# Patient Record
Sex: MALE | URBAN - METROPOLITAN AREA
[De-identification: names, ages, dates, MRNs, and addresses within clinical notes are randomized per-mention and may not be internally consistent; named-entity substitution may affect disease eponyms.]

---

## 2023-04-10 ENCOUNTER — OFFICE VISIT (OUTPATIENT)
Dept: URBAN - METROPOLITAN AREA CLINIC 88 | Facility: CLINIC | Age: 48
End: 2023-04-10
Payer: MEDICAID

## 2023-04-10 DIAGNOSIS — H25.13 AGE-RELATED NUCLEAR CATARACT, BILATERAL: Primary | ICD-10-CM

## 2023-04-10 DIAGNOSIS — E11.3413 DIABETES MELLITUS TYPE 2 WITH SEVERE NON-PROLIFERATIVE RETINOPATHY WITH MACULAR EDEMA, BILATERAL: ICD-10-CM

## 2023-04-10 PROCEDURE — 99203 OFFICE O/P NEW LOW 30 MIN: CPT | Performed by: OPHTHALMOLOGY

## 2023-04-10 ASSESSMENT — INTRAOCULAR PRESSURE
OS: 14
OD: 14

## 2023-04-10 ASSESSMENT — VISUAL ACUITY
OS: 20/200
OD: 20/400

## 2023-04-10 NOTE — IMPRESSION/PLAN
Impression: Age-related nuclear cataract, bilateral: H25.13. Plan: Discussed diagnosis with patient. Discussed risks of progression. Patient must meet with PCP prior to SX to control BSL and A1c, pt states he does not have a PCP. OD dense white cataract OS 1-2 NS. B Scan will be required OD 1st then OS Pt RTC once BSL and A1c are under control to move forward with surgery scheduling process.

## 2023-04-10 NOTE — IMPRESSION/PLAN
Impression: Diabetes mellitus Type 2 with severe non-proliferative retinopathy with macular edema, bilateral: J75.4501. Plan: At this time unable to evaluate retina due to patient cooperation. Stressed the importance of patient controlling A1c and BSL. 

RTC in 1 year